# Patient Record
Sex: FEMALE | Race: WHITE
[De-identification: names, ages, dates, MRNs, and addresses within clinical notes are randomized per-mention and may not be internally consistent; named-entity substitution may affect disease eponyms.]

---

## 2021-03-11 ENCOUNTER — HOSPITAL ENCOUNTER (OUTPATIENT)
Dept: HOSPITAL 46 - DS | Age: 56
Discharge: HOME | End: 2021-03-11
Attending: SURGERY
Payer: COMMERCIAL

## 2021-03-11 VITALS — WEIGHT: 231.49 LBS | BODY MASS INDEX: 34.29 KG/M2 | HEIGHT: 69 IN

## 2021-03-11 DIAGNOSIS — Z90.711: ICD-10-CM

## 2021-03-11 DIAGNOSIS — I10: ICD-10-CM

## 2021-03-11 DIAGNOSIS — Z12.11: Primary | ICD-10-CM

## 2021-03-11 DIAGNOSIS — K64.8: ICD-10-CM

## 2021-03-11 DIAGNOSIS — Q43.8: ICD-10-CM

## 2021-03-11 PROCEDURE — 0DJD8ZZ INSPECTION OF LOWER INTESTINAL TRACT, VIA NATURAL OR ARTIFICIAL OPENING ENDOSCOPIC: ICD-10-PCS | Performed by: SURGERY

## 2021-03-11 PROCEDURE — G0500 MOD SEDAT ENDO SERVICE >5YRS: HCPCS

## 2021-03-11 NOTE — OR
[facility]
                                    [address], [place]  [address]
_________________________________________________________________________________________
                                                                 Signed   
 
 
DATE OF OPERATION:
03/11/2021
 
SURGEON:
Aubrey Oneal MD
 
PREOPERATIVE DIAGNOSIS:
Screening.
 
POSTOPERATIVE DIAGNOSIS:
Long redundant left and sigmoid colon.
 
PROCEDURE:
Colonoscopy without biopsy.
 
ESTIMATED BLOOD LOSS:
None.
 
INDICATIONS:
Leah is a 55-year-old female, asked to see me for her initial screening colonoscopy.
She has no lower GI complaints.  There is no family history of colon cancer or polyps.
In the office, I gave her a pamphlet on colonoscopy and we looked at that together along
with the risks including, but not limited to gas bloating, crampy abdominal pain,
bleeding, perforation requiring surgery, and missed diagnosis.  She also understands the
need for IV conscious sedation.  She had expressed understanding and wished to proceed. 
 
PROCEDURE NOTE:
Leah was taken into our endoscopy suite and placed in the left lateral decubitus
position.  She was given a total of 7 mg of Versed and 150 mcg of fentanyl.  A digital
rectal exam was performed and this was unremarkable.  The adult colonoscope was
introduced and advanced under direct visualization of the camera.  It took some time to
get through the sigmoid colon as it was somewhat tortuous and angulated.  This continued
to the left colon and even the transverse colon.  The farther we went though the better
it was from a technical standpoint.  Eventually, we went around the hepatic flexure and
down into the cecum itself.  We had used some extra sedation and abdominal compression
in order to advance the scope.  Her prep was quite excellent.  We could easily see the
appendiceal orifice and the ileocecal valve.  The scope was then slowly withdrawn.  She
had no pathology throughout the entire colon or rectum.  Upon retroflexion of scope, she
has minimal if any internal hemorrhoid tissue.  After this, the gas was suctioned out
and the colonoscope removed.  Leah tolerated the procedure quite well. 
 
RECOMMENDATIONS:
 
    Electronically Signed By: AUBREY ONEAL MD  03/11/21 0944
_________________________________________________________________________________________
PATIENT NAME:     LEAH SON                       
MEDICAL RECORD #: [id number]            OPERATIVE REPORT              
          ACCT #: [id number]  
DATE OF BIRTH:   [birth year]            REPORT #: [id number]      
PHYSICIAN:        AUBREY ONEAL MD             
PCP:              JULITA YOO MD           
REPORT IS CONFIDENTIAL AND NOT TO BE RELEASED WITHOUT AUTHORIZATION
 
 
                                  [address]  [address]
_________________________________________________________________________________________
                                                                 Signed   
 
 
Leah can follow up in 10 years for a repeat colonoscopy.
 
 
 
            ________________________________________
            Aubrey Oneal MD 
 
 
ALB/MODL
Job #:  [id number]
DD:  03/11/2021 07:46:58
DT:  03/11/2021 08:55:37
 
cc:            MD Julita Chatman MD
 
 
Copies:  AUBREY ONEAL MD
~
 
 
 
 
 
 
 
 
 
 
 
 
 
 
 
 
 
 
 
 
 
 
 
    Electronically Signed By: AUBREY ONEAL MD  03/11/21 0944
_________________________________________________________________________________________
PATIENT NAME:     LEAH SON                       
MEDICAL RECORD #: [id number]            OPERATIVE REPORT              
          ACCT #: [id number]  
DATE OF BIRTH:   [birth year]            REPORT #: [id number]      
PHYSICIAN:        AUBREY ONEAL MD             
PCP:              JULITA YOO MD           
REPORT IS CONFIDENTIAL AND NOT TO BE RELEASED WITHOUT AUTHORIZATION